# Patient Record
Sex: FEMALE | URBAN - METROPOLITAN AREA
[De-identification: names, ages, dates, MRNs, and addresses within clinical notes are randomized per-mention and may not be internally consistent; named-entity substitution may affect disease eponyms.]

---

## 2023-03-07 ENCOUNTER — APPOINTMENT (RX ONLY)
Dept: URBAN - METROPOLITAN AREA CLINIC 168 | Facility: CLINIC | Age: 68
Setting detail: DERMATOLOGY
End: 2023-03-07

## 2023-03-07 PROBLEM — D48.5 NEOPLASM OF UNCERTAIN BEHAVIOR OF SKIN: Status: ACTIVE | Noted: 2023-03-07

## 2023-03-07 PROCEDURE — ? BIOPSY BY SHAVE METHOD

## 2023-03-07 PROCEDURE — 11102 TANGNTL BX SKIN SINGLE LES: CPT

## 2023-03-07 NOTE — PROCEDURE: BIOPSY BY SHAVE METHOD
Detail Level: Detailed
Depth Of Biopsy: dermis
Was A Bandage Applied: Yes
Size Of Lesion In Cm: 0.8
X Size Of Lesion In Cm: 0
Biopsy Type: H and E
Biopsy Method: Personna blade
Anesthesia Type: 1% lidocaine with epinephrine
Hemostasis: Drysol
Wound Care: Petrolatum
Dressing: bandage
Destruction After The Procedure: No
Type Of Destruction Used: Curettage
Curettage Text: The wound bed was treated with curettage after the biopsy was performed.
Cryotherapy Text: The wound bed was treated with cryotherapy after the biopsy was performed.
Electrodesiccation Text: The wound bed was treated with electrodesiccation after the biopsy was performed.
Electrodesiccation And Curettage Text: The wound bed was treated with electrodesiccation and curettage after the biopsy was performed.
Silver Nitrate Text: The wound bed was treated with silver nitrate after the biopsy was performed.
Lab: -[id number]
Lab Facility: [id number]
Consent: Written consent was obtained and risks were reviewed including but not limited to scarring, infection, bleeding, scabbing, incomplete removal, nerve damage and allergy to anesthesia.
Post-Care Instructions: I reviewed with the patient in detail post-care instructions. Patient is to keep the biopsy site dry overnight, and then apply plain white petrolatum jelly once daily until healed. Patient should wash with soap and water daily.
Notification Instructions: Patient will be notified of biopsy results. However, patient instructed to call the office if not contacted within 2 weeks.
Billing Type: Third-Party Bill
Information: Selecting Yes will display possible errors in your note based on the variables you have selected. This validation is only offered as a suggestion for you. PLEASE NOTE THAT THE VALIDATION TEXT WILL BE REMOVED WHEN YOU FINALIZE YOUR NOTE. IF YOU WANT TO FAX A PRELIMINARY NOTE YOU WILL NEED TO TOGGLE THIS TO 'NO' IF YOU DO NOT WANT IT IN YOUR FAXED NOTE.

## 2023-04-14 ENCOUNTER — APPOINTMENT (RX ONLY)
Dept: URBAN - METROPOLITAN AREA CLINIC 144 | Facility: CLINIC | Age: 68
Setting detail: DERMATOLOGY
End: 2023-04-14

## 2023-04-14 PROBLEM — C44.311 BASAL CELL CARCINOMA OF SKIN OF NOSE: Status: ACTIVE | Noted: 2023-04-14

## 2023-04-14 PROCEDURE — ? CONSULTATION FOR ELECTRON BEAM THERAPY

## 2023-04-14 PROCEDURE — ? CONSULTATION FOR MOHS SURGERY

## 2023-04-14 PROCEDURE — ? COUNSELING

## 2023-04-14 PROCEDURE — 99203 OFFICE O/P NEW LOW 30 MIN: CPT

## 2023-04-14 NOTE — HPI: SKIN LESION (BASAL CELL CARCINOMA)
Is This A New Presentation, Or A Follow-Up?: Basal Cell Carcinoma
Location From Outside Provider (Will Override Previously Chosen Location): Nasal supratip
When Was Basal Cell Biopsied? (Optional): 03/07/2023

## 2023-04-14 NOTE — PROCEDURE: CONSULTATION FOR MOHS SURGERY
Detail Level: Detailed
X Size Of Lesion In Cm (Optional): 0
Anatomic Location From Referring Provider: nasal supratip
Incorporate Mauc In Note: Yes

## 2023-07-14 ENCOUNTER — APPOINTMENT (RX ONLY)
Dept: URBAN - NONMETROPOLITAN AREA CLINIC 12 | Facility: CLINIC | Age: 68
Setting detail: DERMATOLOGY
End: 2023-07-14

## 2023-07-14 PROBLEM — C44.311 BASAL CELL CARCINOMA OF SKIN OF NOSE: Status: ACTIVE | Noted: 2023-07-14

## 2023-07-14 PROCEDURE — 99214 OFFICE O/P EST MOD 30 MIN: CPT

## 2023-07-14 PROCEDURE — ? PATIENT SPECIFIC COUNSELING

## 2023-07-14 PROCEDURE — ? CONSULTATION FOR ELECTRON BEAM THERAPY

## 2023-07-14 NOTE — PROCEDURE: PATIENT SPECIFIC COUNSELING
The patient was educated about the following:\\nIt is normal to have skin reactions during radiation therapy treatment. \\nPatients may develop redness at the treatment site similar to a sunburn. \\nTopical creams are prescribed that will help reduce side effects and limit irritation.
Detail Level: Zone
Over 60 plan:\\nWe discussed several treatment regimens ranging from 4-6 weeks with the patient. The patient understands there is a better potential cosmetic result and less side effects/risks (brisk skin reaction, hypopigmentation to the treated field, telangiectasia) with a lower daily dose regimen. We extensively discussed EBT vs Mohs surgery. After discussion the patient would like to proceed with a protracted treatment regimen. We will schedule initial simulations today. At this time all questions appear to be answered to the patient’s satisfaction. Thank you for allowing the radiation team to participate in the care of this patient.

## 2023-07-14 NOTE — PROCEDURE: CONSULTATION FOR ELECTRON BEAM THERAPY
Previous Radiation History: No
Other Plan: We discussed a 5 to 6 week treatment plan and patient agreeable to the 6 week treatment regimen at 200 cgy daily
Detail Level: Detailed
X Size Of Lesion In Cm (Optional): 1.1
Size Of Lesion: 1.2

## 2023-08-02 ENCOUNTER — APPOINTMENT (RX ONLY)
Dept: URBAN - NONMETROPOLITAN AREA CLINIC 12 | Facility: CLINIC | Age: 68
Setting detail: DERMATOLOGY
End: 2023-08-02

## 2023-08-02 PROBLEM — C44.311 BASAL CELL CARCINOMA OF SKIN OF NOSE: Status: ACTIVE | Noted: 2023-08-02

## 2023-08-02 PROCEDURE — 77290 THER RAD SIMULAJ FIELD CPLX: CPT

## 2023-08-02 PROCEDURE — ? INITIAL COMPLEX SIMULATION

## 2023-08-02 PROCEDURE — 77263 THER RADIOLOGY TX PLNG CPLX: CPT

## 2023-08-02 NOTE — PROCEDURE: INITIAL COMPLEX SIMULATION
Custom Bolus Type: custom mixed, molded, and shaped
Acetate Template Statement (Will Not Render If Left Blank): An acetate template will be used to fabricate a custom lead shielding device to allow for lead on skin collimation. This type of shielding will best limit beam penumbra. Additional shielding will also be added to protect adjacent strutures.
Cummulative Dose (Include Units): 6000cGy
Detail Level: Simple
Intro Statement For Treatment Plan (Will Not Render If Left Blank): Records, reports, pathology, and physical exam have been completed, interpreted and reviewed to assist in defining the course of radiation therapy treatment. Parameters interpreted include tumor histology, size, location, extent of disease and prior medical history. These factors will integrate into radiation field design. A complex electron beam simulation is necessary to accomplish a reproducible beam arrangement, field size and target volume with which to treat the tumor. Beam modifying devices will be designed and utilized as necessary to optimize the treatment and to best spare normal tissues. Complex blocking will be performed to minimize radiation scatter (penumbra), shape to target volume, and to best protect adjacent and underlying normal tissues. Fields will be verified on the patient prior to radiation delivery.
Daily Dose (Include Units): 200cGy
Closing Statement (Will Not Render If Left Blank): The patient tolerated the simulation well and has had all of their questions answered to their satisfaction. The patient will return for field verification, simple simulation before radiation is delivered to confirm patient positioning and block shaping and positioning.
Pathology: Use Selected EMA Impression
Bolus Thickness In Cm: 0.6
Intro Statement (Will Not Render If Left Blank): I then designed a radiation field to include the gross lesion (GTV) with additional margin that accounted for both potential subclinical microscopic extension (CTV), as well as for the beam characteristics of superficial electrons (PTV). Care was taken in designing the field near adjacent normal tissue structures. The target volume was drawn on the skin surface with a permanent marker and then the design with reference marks was carefully traced onto an acetate template. Digital photographs were taken.
Location Override (Location Will Render As Ema Body Location If Left Blank): BCC-Nasal Supratip
Send Clinical Treatment Planning Code To Pm?: Yes - [id number]
Use Custom Eyeshields: Yes
Dosing Schedule: 5 days a week
Energy In Mev: 6
Treatment Length (Include Units): 6 weeks
Position: supine
Isodose: [address]
Eye Shield Statement (Will Not Render If Left Blank): External eye shields will be placed daily to limit scatter dose to the lenses of the eyes. Due to the COVID-19 pandemic and the risk to our patients, customized eye shielding is deamed safer than reusable eye shielding. We therefore will custom design and fabricate bilateral eye shields made of shaped lead and covered by wax, for each of our patients. These will be used only during the course of treatment and then destroyed and constitute a complex fabrication process and device.

## 2023-08-04 ENCOUNTER — APPOINTMENT (RX ONLY)
Dept: URBAN - NONMETROPOLITAN AREA CLINIC 12 | Facility: CLINIC | Age: 68
Setting detail: DERMATOLOGY
End: 2023-08-04

## 2023-08-04 PROBLEM — C44.311 BASAL CELL CARCINOMA OF SKIN OF NOSE: Status: ACTIVE | Noted: 2023-08-04

## 2023-08-04 PROCEDURE — ? SIMPLE SIMULATION - BLOCK CHECK

## 2023-08-04 PROCEDURE — 77280 THER RAD SIMULAJ FIELD SMPL: CPT

## 2023-08-04 NOTE — PROCEDURE: SIMPLE SIMULATION - BLOCK CHECK
Location Override (Location Will Render As Ema Body Location If Left Blank): BCC-Nasal Supratip
Position: supine
Bolus Thickness In Cm: 0.6
Detail Level: Simple
Custom Bolus Type: custom mixed, molded, and shaped
Closing Statement (Will Not Render If Left Blank): Working with the physician, the therapist adjusted the machine gantry, table, and collimator and adjusted patient positioning to allow an appositional electron beam. SSD measurements were verified using the projected optical distance indicator light and room lasers. The field was positioned at 100cm SSD to the top of the bolus material. The machine parameters were recorded. The treatment light field was photographed projected onto the patient's skin. Further digital photographs were taken and will be used as a reference.

## 2023-08-07 ENCOUNTER — APPOINTMENT (RX ONLY)
Dept: URBAN - NONMETROPOLITAN AREA CLINIC 12 | Facility: CLINIC | Age: 68
Setting detail: DERMATOLOGY
End: 2023-08-07

## 2023-08-07 PROBLEM — C44.311 BASAL CELL CARCINOMA OF SKIN OF NOSE: Status: ACTIVE | Noted: 2023-08-07

## 2023-08-07 PROCEDURE — 77427 RADIATION TX MANAGEMENT X5: CPT

## 2023-08-07 PROCEDURE — ? ELECTRON BEAM THERAPY

## 2023-08-07 PROCEDURE — G6012 RADIATION TREATMENT DELIVERY: HCPCS

## 2023-08-07 NOTE — PROCEDURE: ELECTRON BEAM THERAPY
Daily Dosage Administered: [id number] [facility]
Total Rx Dosage: [address]
Change Daily Dosage Administered Mid Treatment?: No
Location Override (Location Will Render As Ema Body Location If Left Blank): nasal supratip
Mild, Moderate, Brisk Erythema Or Dry Desquamation Counseling: The patient was instructed in meticulous wound care and counseled on potential and expected side effects of treatment and reasonable expectations for the time to heal. They appeared to have all of their questions answered to their satisfaction. They were recommended to rinse the treatment site with mild soap and water (recommended Dove, Neutrogena or Cetaphil). After rinsing the treatment site twice a day they are to apply a pea-sized amount of Aquaphor healing ointment twice daily. Aquaphor samples were provided. The patient understands to call with any questions, concerns or difficulties. They were informed of the potentital for infection and counseled on common signs and symptoms of infection including skin redness extending outside of the treatment area, enlargement or skin breakdown, significant warmth, pain, fever or chills or sweats. They voiced an understanding to contact us immediately if any of these symptoms develop. Their follow up appointment was scheduled. They were also instructed to follow-up with dermatology for skin cancer surveillance.
Radiation Units: cGy
Detail Level: Simple
Date Of Fraction 1: 08/07/2023
Dimensions-X Axis In Cm: 0
Early, Moist Desquamation Counseling: The patient was instructed in meticulous wound care and counseled on potential and expected side effects of treatment and reasonable expectations for the time to heal. They appeared to have all of their questions answered to their satisfaction. They were recommended to cleanse the treatment site with dilute hydrogen peroxide and water (using 50:50 ratio). They were told how to apply the solution gently with a cotton ball twice daily. After cleaning, they were instructed to pat the area dry with a soft cloth and then apply a small amount of Silvadene 1% cream twice daily. They were told to return to the clinic in 7 days for re-evaluation. The patient understands to call with any questions, concerns or difficulties. They were informed of the potentital for infection and counseled on common signs and symptoms of infection including skin redness extending outside of the treatment area, enlargement or skin breakdown, significant warmth, pain, fever or chills or sweats. They voiced an understanding to contact us immediately if any of these symptoms develop. Their follow up appointment was scheduled. They were also instructed to follow-up with dermatology for skin cancer surveillance.
Date Of Fraction 2: 08/08/2023
Send Cpt Codes To Pm?: Yes
Date Of Fraction 3: 08/09/2023
Date Of Fraction 4: 08/10/2023
Ebt Cpt Code (Please Add Code Details Below): 
Date Of Fraction 5: 08/11/2023
Assessment: Appropriate reaction
Total Planned Fractions: [id number] [address]

## 2023-08-14 ENCOUNTER — APPOINTMENT (RX ONLY)
Dept: URBAN - NONMETROPOLITAN AREA CLINIC 12 | Facility: CLINIC | Age: 68
Setting detail: DERMATOLOGY
End: 2023-08-14

## 2023-08-14 PROBLEM — C44.311 BASAL CELL CARCINOMA OF SKIN OF NOSE: Status: ACTIVE | Noted: 2023-08-14

## 2023-08-14 PROCEDURE — G6012 RADIATION TREATMENT DELIVERY: HCPCS

## 2023-08-14 PROCEDURE — 77427 RADIATION TX MANAGEMENT X5: CPT

## 2023-08-14 PROCEDURE — ? ELECTRON BEAM THERAPY

## 2023-08-14 NOTE — PROCEDURE: ELECTRON BEAM THERAPY
Daily Dosage Administered: [id number] [facility]
Date Of Fraction 8: 08/16/2023
Total Rx Dosage: [address]
Change Daily Dosage Administered Mid Treatment?: No
Location Override (Location Will Render As Ema Body Location If Left Blank): nasal supratip
Mild, Moderate, Brisk Erythema Or Dry Desquamation Counseling: The patient was instructed in meticulous wound care and counseled on potential and expected side effects of treatment and reasonable expectations for the time to heal. They appeared to have all of their questions answered to their satisfaction. They were recommended to rinse the treatment site with mild soap and water (recommended Dove, Neutrogena or Cetaphil). After rinsing the treatment site twice a day they are to apply a pea-sized amount of Aquaphor healing ointment twice daily. Aquaphor samples were provided. The patient understands to call with any questions, concerns or difficulties. They were informed of the potentital for infection and counseled on common signs and symptoms of infection including skin redness extending outside of the treatment area, enlargement or skin breakdown, significant warmth, pain, fever or chills or sweats. They voiced an understanding to contact us immediately if any of these symptoms develop. Their follow up appointment was scheduled. They were also instructed to follow-up with dermatology for skin cancer surveillance.
Date Of Fraction 9: 08/17/2023
PA notified, Zofran IVP given, pt continuing to belch & vomit bile. Maalox ordered & given, per PA GI will be consulted. Will endorse to day RN's
Radiation Units: cGy
Detail Level: Simple
Date Of Fraction 10: 08/18/2023
Date Of Fraction 1: 08/07/2023
Dimensions-X Axis In Cm: 0
Early, Moist Desquamation Counseling: The patient was instructed in meticulous wound care and counseled on potential and expected side effects of treatment and reasonable expectations for the time to heal. They appeared to have all of their questions answered to their satisfaction. They were recommended to cleanse the treatment site with dilute hydrogen peroxide and water (using 50:50 ratio). They were told how to apply the solution gently with a cotton ball twice daily. After cleaning, they were instructed to pat the area dry with a soft cloth and then apply a small amount of Silvadene 1% cream twice daily. They were told to return to the clinic in 7 days for re-evaluation. The patient understands to call with any questions, concerns or difficulties. They were informed of the potentital for infection and counseled on common signs and symptoms of infection including skin redness extending outside of the treatment area, enlargement or skin breakdown, significant warmth, pain, fever or chills or sweats. They voiced an understanding to contact us immediately if any of these symptoms develop. Their follow up appointment was scheduled. They were also instructed to follow-up with dermatology for skin cancer surveillance.
Date Of Fraction 2: 08/08/2023
Send Cpt Codes To Pm?: Yes
Date Of Fraction 3: 08/09/2023
Date Of Fraction 4: 08/10/2023
Ebt Cpt Code (Please Add Code Details Below): 
Date Of Fraction 5: 08/11/2023
Assessment: Appropriate reaction
Date Of Fraction 6: 08/14/2023
Total Planned Fractions: [id number] [address]
Date Of Fraction 7: 08/15/2023

## 2023-08-21 ENCOUNTER — APPOINTMENT (RX ONLY)
Dept: URBAN - NONMETROPOLITAN AREA CLINIC 12 | Facility: CLINIC | Age: 68
Setting detail: DERMATOLOGY
End: 2023-08-21

## 2023-08-21 PROBLEM — C44.311 BASAL CELL CARCINOMA OF SKIN OF NOSE: Status: ACTIVE | Noted: 2023-08-21

## 2023-08-21 PROCEDURE — 77427 RADIATION TX MANAGEMENT X5: CPT

## 2023-08-21 PROCEDURE — ? ELECTRON BEAM THERAPY

## 2023-08-21 PROCEDURE — G6012 RADIATION TREATMENT DELIVERY: HCPCS

## 2023-08-21 NOTE — PROCEDURE: ELECTRON BEAM THERAPY
Daily Dosage Administered: [id number] [facility]
Date Of Fraction 8: 08/16/2023
Total Rx Dosage: [address]
Change Daily Dosage Administered Mid Treatment?: No
Location Override (Location Will Render As Ema Body Location If Left Blank): nasal supratip
Mild, Moderate, Brisk Erythema Or Dry Desquamation Counseling: The patient was instructed in meticulous wound care and counseled on potential and expected side effects of treatment and reasonable expectations for the time to heal. They appeared to have all of their questions answered to their satisfaction. They were recommended to rinse the treatment site with mild soap and water (recommended Dove, Neutrogena or Cetaphil). After rinsing the treatment site twice a day they are to apply a pea-sized amount of Aquaphor healing ointment twice daily. Aquaphor samples were provided. The patient understands to call with any questions, concerns or difficulties. They were informed of the potentital for infection and counseled on common signs and symptoms of infection including skin redness extending outside of the treatment area, enlargement or skin breakdown, significant warmth, pain, fever or chills or sweats. They voiced an understanding to contact us immediately if any of these symptoms develop. Their follow up appointment was scheduled. They were also instructed to follow-up with dermatology for skin cancer surveillance.
Date Of Fraction 9: 08/17/2023
Radiation Units: cGy
Detail Level: Simple
Date Of Fraction 10: 08/18/2023
Date Of Fraction 1: 08/07/2023
Dimensions-X Axis In Cm: 0
Early, Moist Desquamation Counseling: The patient was instructed in meticulous wound care and counseled on potential and expected side effects of treatment and reasonable expectations for the time to heal. They appeared to have all of their questions answered to their satisfaction. They were recommended to cleanse the treatment site with dilute hydrogen peroxide and water (using 50:50 ratio). They were told how to apply the solution gently with a cotton ball twice daily. After cleaning, they were instructed to pat the area dry with a soft cloth and then apply a small amount of Silvadene 1% cream twice daily. They were told to return to the clinic in 7 days for re-evaluation. The patient understands to call with any questions, concerns or difficulties. They were informed of the potentital for infection and counseled on common signs and symptoms of infection including skin redness extending outside of the treatment area, enlargement or skin breakdown, significant warmth, pain, fever or chills or sweats. They voiced an understanding to contact us immediately if any of these symptoms develop. Their follow up appointment was scheduled. They were also instructed to follow-up with dermatology for skin cancer surveillance.
Date Of Fraction 2: 08/08/2023
Date Of Fraction 11: 08/21/2023
Send Cpt Codes To Pm?: Yes
Date Of Fraction 3: 08/09/2023
Date Of Fraction 12: 08/22/2023
Date Of Fraction 13: 08/23/2023
Date Of Fraction 4: 08/10/2023
Ebt Cpt Code (Please Add Code Details Below): 
Date Of Fraction 14: 08/24/2023
Date Of Fraction 5: 08/11/2023
Assessment: Appropriate reaction
Date Of Fraction 15: 08/25/2023
Date Of Fraction 6: 08/14/2023
Total Planned Fractions: [id number] [address]
Date Of Fraction 7: 08/15/2023

## 2023-08-23 ENCOUNTER — APPOINTMENT (RX ONLY)
Dept: URBAN - NONMETROPOLITAN AREA CLINIC 12 | Facility: CLINIC | Age: 68
Setting detail: DERMATOLOGY
End: 2023-08-23

## 2023-08-23 ENCOUNTER — RX ONLY (OUTPATIENT)
Age: 68
Setting detail: RX ONLY
End: 2023-08-23

## 2023-08-23 PROBLEM — C44.311 BASAL CELL CARCINOMA OF SKIN OF NOSE: Status: ACTIVE | Noted: 2023-08-23

## 2023-08-23 PROCEDURE — 77290 THER RAD SIMULAJ FIELD CPLX: CPT

## 2023-08-23 PROCEDURE — ? COMPLEX SIMULATION - REDUCED FIELD

## 2023-08-23 RX ORDER — MUPIROCIN 20 MG/G
OINTMENT TOPICAL
Qty: 22 | Refills: 1 | Status: ERX | COMMUNITY
Start: 2023-08-23

## 2023-08-23 NOTE — PROCEDURE: COMPLEX SIMULATION - REDUCED FIELD
Energy In Mev: 6
Custom Bolus Type: custom mixed, molded, and shaped
Intro Statement (Will Not Render If Left Blank): A new radiation electron beam field was designed to include the gross lesion (GTV) with additional margin that accounted for both potential subclinical microscopic extension (CTV), as well as for the beam characteristics of superficial electrons (PTV). This field took into account the changes in the volume of the tumor that have occurred during radiation therapy. Care was taken in designing the field near adjacent normal tissue structures. The target volume was drawn on the skin surface with a permanent marker and then the design with reference marks was carefully traced onto an acetate template. Digital photographs were taken.
Isodose: [address]
Acetate Template Statement (Will Not Render If Left Blank): The acetate template will be used to fabricate a custom lead on skin shielding device to allow for lead on skin collimation. This type of shielding will best limit beam penumbra and define the field.
Closing Statement (Will Not Render If Left Blank): The patient tolerated the complex electron beam simulation well and will continue on radiotherapy using the modified field. The patient will return for a field verification simulation before radiation is delivered to confirm patient positioning and block fabrication parameters.
Detail Level: Simple
Pathology: Use Selected EMA Impression
Bolus Thickness In Cm: 0.6
Position: supine

## 2023-08-25 ENCOUNTER — APPOINTMENT (RX ONLY)
Dept: URBAN - NONMETROPOLITAN AREA CLINIC 12 | Facility: CLINIC | Age: 68
Setting detail: DERMATOLOGY
End: 2023-08-25

## 2023-08-25 PROBLEM — C44.311 BASAL CELL CARCINOMA OF SKIN OF NOSE: Status: ACTIVE | Noted: 2023-08-25

## 2023-08-25 PROCEDURE — ? SIMPLE SIMULATION - BLOCK CHECK

## 2023-08-25 PROCEDURE — 77280 THER RAD SIMULAJ FIELD SMPL: CPT

## 2023-08-28 ENCOUNTER — APPOINTMENT (RX ONLY)
Dept: URBAN - NONMETROPOLITAN AREA CLINIC 12 | Facility: CLINIC | Age: 68
Setting detail: DERMATOLOGY
End: 2023-08-28

## 2023-08-28 PROBLEM — C44.311 BASAL CELL CARCINOMA OF SKIN OF NOSE: Status: ACTIVE | Noted: 2023-08-28

## 2023-08-28 PROCEDURE — G6012 RADIATION TREATMENT DELIVERY: HCPCS

## 2023-08-28 PROCEDURE — 77427 RADIATION TX MANAGEMENT X5: CPT

## 2023-08-28 PROCEDURE — ? ELECTRON BEAM THERAPY

## 2023-08-28 NOTE — PROCEDURE: ELECTRON BEAM THERAPY
Daily Dosage Administered: [id number] [facility]
Date Of Fraction 17: 08/29/2023
Date Of Fraction 8: 08/16/2023
Total Rx Dosage: [address]
Change Daily Dosage Administered Mid Treatment?: No
Location Override (Location Will Render As Ema Body Location If Left Blank): Nasal /Supratip
Mild, Moderate, Brisk Erythema Or Dry Desquamation Counseling: The patient was instructed in meticulous wound care and counseled on potential and expected side effects of treatment and reasonable expectations for the time to heal. They appeared to have all of their questions answered to their satisfaction. They were recommended to rinse the treatment site with mild soap and water (recommended Dove, Neutrogena or Cetaphil). After rinsing the treatment site twice a day they are to apply a pea-sized amount of Aquaphor healing ointment twice daily. Aquaphor samples were provided. The patient understands to call with any questions, concerns or difficulties. They were informed of the potentital for infection and counseled on common signs and symptoms of infection including skin redness extending outside of the treatment area, enlargement or skin breakdown, significant warmth, pain, fever or chills or sweats. They voiced an understanding to contact us immediately if any of these symptoms develop. Their follow up appointment was scheduled. They were also instructed to follow-up with dermatology for skin cancer surveillance.
Date Of Fraction 9: 08/17/2023
Radiation Units: cGy
Date Of Fraction 18: 08/30/2023
Detail Level: Simple
Date Of Fraction 10: 08/18/2023
Date Of Fraction 19: 08/31/2023
Date Of Fraction 1: 08/07/2023
Dimensions-X Axis In Cm: 0
Early, Moist Desquamation Counseling: The patient was instructed in meticulous wound care and counseled on potential and expected side effects of treatment and reasonable expectations for the time to heal. They appeared to have all of their questions answered to their satisfaction. They were recommended to cleanse the treatment site with dilute hydrogen peroxide and water (using 50:50 ratio). They were told how to apply the solution gently with a cotton ball twice daily. After cleaning, they were instructed to pat the area dry with a soft cloth and then apply a small amount of Silvadene 1% cream twice daily. They were told to return to the clinic in 7 days for re-evaluation. The patient understands to call with any questions, concerns or difficulties. They were informed of the potentital for infection and counseled on common signs and symptoms of infection including skin redness extending outside of the treatment area, enlargement or skin breakdown, significant warmth, pain, fever or chills or sweats. They voiced an understanding to contact us immediately if any of these symptoms develop. Their follow up appointment was scheduled. They were also instructed to follow-up with dermatology for skin cancer surveillance.
Date Of Fraction 2: 08/08/2023
Date Of Fraction 20: 09/01/2023
Date Of Fraction 11: 08/21/2023
Send Cpt Codes To Pm?: Yes
Date Of Fraction 3: 08/09/2023
Date Of Fraction 12: 08/22/2023
Date Of Fraction 13: 08/23/2023
Date Of Fraction 4: 08/10/2023
Ebt Cpt Code (Please Add Code Details Below): 
Date Of Fraction 14: 08/24/2023
Date Of Fraction 5: 08/11/2023
Assessment: Appropriate reaction
Date Of Fraction 15: 08/25/2023
Date Of Fraction 6: 08/14/2023
Total Planned Fractions: [id number] [address]
Date Of Fraction 16: 08/28/2023
Date Of Fraction 7: 08/15/2023

## 2023-08-30 ENCOUNTER — APPOINTMENT (RX ONLY)
Dept: URBAN - NONMETROPOLITAN AREA CLINIC 12 | Facility: CLINIC | Age: 68
Setting detail: DERMATOLOGY
End: 2023-08-30

## 2023-08-30 PROBLEM — C44.311 BASAL CELL CARCINOMA OF SKIN OF NOSE: Status: ACTIVE | Noted: 2023-08-30

## 2023-08-30 PROCEDURE — ? SIMPLE SIMULATION - BLOCK CHECK

## 2023-08-30 PROCEDURE — ? COMPLEX SIMULATION - REDUCED FIELD

## 2023-08-30 PROCEDURE — 77280 THER RAD SIMULAJ FIELD SMPL: CPT | Mod: 59

## 2023-08-30 PROCEDURE — 77290 THER RAD SIMULAJ FIELD CPLX: CPT

## 2023-08-30 NOTE — PROCEDURE: COMPLEX SIMULATION - REDUCED FIELD
Custom Bolus Type: custom mixed, molded, and shaped
Intro Statement (Will Not Render If Left Blank): A new radiation electron beam field was designed to include the gross lesion (GTV) with additional margin that accounted for both potential subclinical microscopic extension (CTV), as well as for the beam characteristics of superficial electrons (PTV). This field took into account the changes in the volume of the tumor that have occurred during radiation therapy. Care was taken in designing the field near adjacent normal tissue structures. The target volume was drawn on the skin surface with a permanent marker and then the design with reference marks was carefully traced onto an acetate template. Digital photographs were taken.
Closing Statement (Will Not Render If Left Blank): The patient tolerated the complex electron beam simulation well and will continue on radiotherapy using the modified field. The patient will return for a field verification simulation before radiation is delivered to confirm patient positioning and block fabrication parameters.
Position: supine
Detail Level: Simple
Energy In Mev: 6
Bolus Thickness In Cm: 0.6
Pathology: Use Selected EMA Impression
Acetate Template Statement (Will Not Render If Left Blank): The acetate template will be used to fabricate a custom lead on skin shielding device to allow for lead on skin collimation. This type of shielding will best limit beam penumbra and define the field.
Isodose: [address]

## 2023-08-30 NOTE — PROCEDURE: SIMPLE SIMULATION - BLOCK CHECK
Bolus Thickness In Cm: 0.6
Detail Level: Simple
Position: supine
Custom Bolus Type: custom mixed, molded, and shaped
Closing Statement (Will Not Render If Left Blank): Working with the physician, the therapist adjusted the machine gantry, table, and collimator and adjusted patient positioning to allow an appositional electron beam. SSD measurements were verified using the projected optical distance indicator light and room lasers. The field was positioned at 100cm SSD to the top of the bolus material. The machine parameters were recorded. The treatment light field was photographed projected onto the patient's skin. Further digital photographs were taken and will be used as a reference.

## 2023-09-05 ENCOUNTER — APPOINTMENT (RX ONLY)
Dept: URBAN - NONMETROPOLITAN AREA CLINIC 12 | Facility: CLINIC | Age: 68
Setting detail: DERMATOLOGY
End: 2023-09-05

## 2023-09-05 PROBLEM — C44.311 BASAL CELL CARCINOMA OF SKIN OF NOSE: Status: ACTIVE | Noted: 2023-09-05

## 2023-09-05 PROCEDURE — 77427 RADIATION TX MANAGEMENT X5: CPT

## 2023-09-05 PROCEDURE — ? ELECTRON BEAM THERAPY

## 2023-09-05 PROCEDURE — G6012 RADIATION TREATMENT DELIVERY: HCPCS

## 2023-09-05 NOTE — PROCEDURE: ELECTRON BEAM THERAPY
Daily Dosage Administered: [id number] [facility]
Date Of Fraction 17: 08/29/2023
Date Of Fraction 8: 08/16/2023
Total Rx Dosage: [address]
Change Daily Dosage Administered Mid Treatment?: No
Location Override (Location Will Render As Ema Body Location If Left Blank): Nasal /Supratip
Mild, Moderate, Brisk Erythema Or Dry Desquamation Counseling: The patient was instructed in meticulous wound care and counseled on potential and expected side effects of treatment and reasonable expectations for the time to heal. They appeared to have all of their questions answered to their satisfaction. They were recommended to rinse the treatment site with mild soap and water (recommended Dove, Neutrogena or Cetaphil). After rinsing the treatment site twice a day they are to apply a pea-sized amount of Aquaphor healing ointment twice daily. Aquaphor samples were provided. The patient understands to call with any questions, concerns or difficulties. They were informed of the potentital for infection and counseled on common signs and symptoms of infection including skin redness extending outside of the treatment area, enlargement or skin breakdown, significant warmth, pain, fever or chills or sweats. They voiced an understanding to contact us immediately if any of these symptoms develop. Their follow up appointment was scheduled. They were also instructed to follow-up with dermatology for skin cancer surveillance.
Date Of Fraction 9: 08/17/2023
Radiation Units: cGy
Date Of Fraction 18: 08/30/2023
Detail Level: Simple
Date Of Fraction 10: 08/18/2023
Date Of Fraction 19: 08/31/2023
Date Of Fraction 1: 08/07/2023
Dimensions-X Axis In Cm: 0
Early, Moist Desquamation Counseling: The patient was instructed in meticulous wound care and counseled on potential and expected side effects of treatment and reasonable expectations for the time to heal. They appeared to have all of their questions answered to their satisfaction. They were recommended to cleanse the treatment site with dilute hydrogen peroxide and water (using 50:50 ratio). They were told how to apply the solution gently with a cotton ball twice daily. After cleaning, they were instructed to pat the area dry with a soft cloth and then apply a small amount of Silvadene 1% cream twice daily. They were told to return to the clinic in 7 days for re-evaluation. The patient understands to call with any questions, concerns or difficulties. They were informed of the potentital for infection and counseled on common signs and symptoms of infection including skin redness extending outside of the treatment area, enlargement or skin breakdown, significant warmth, pain, fever or chills or sweats. They voiced an understanding to contact us immediately if any of these symptoms develop. Their follow up appointment was scheduled. They were also instructed to follow-up with dermatology for skin cancer surveillance.
Date Of Fraction 2: 08/08/2023
Date Of Fraction 20: 09/01/2023
Date Of Fraction 11: 08/21/2023
Send Cpt Codes To Pm?: Yes
Date Of Fraction 3: 08/09/2023
Date Of Fraction 21: 09/05/2023
Date Of Fraction 12: 08/22/2023
Date Of Fraction 22: 09/06/2023
Date Of Fraction 13: 08/23/2023
Date Of Fraction 4: 08/10/2023
Ebt Cpt Code (Please Add Code Details Below): 
Date Of Fraction 23: 09/07/2023
Date Of Fraction 14: 08/24/2023
Date Of Fraction 5: 08/11/2023
Assessment: Appropriate reaction
Date Of Fraction 24: 09/08/2023
Date Of Fraction 15: 08/25/2023
Date Of Fraction 6: 08/14/2023
Total Planned Fractions: [id number] [address]
Date Of Fraction 16: 08/28/2023
Date Of Fraction 25: 09/11/2023
Date Of Fraction 7: 08/15/2023

## 2023-09-12 ENCOUNTER — APPOINTMENT (RX ONLY)
Dept: URBAN - NONMETROPOLITAN AREA CLINIC 12 | Facility: CLINIC | Age: 68
Setting detail: DERMATOLOGY
End: 2023-09-12

## 2023-09-12 PROBLEM — C44.311 BASAL CELL CARCINOMA OF SKIN OF NOSE: Status: ACTIVE | Noted: 2023-09-12

## 2023-09-12 PROCEDURE — G6012 RADIATION TREATMENT DELIVERY: HCPCS

## 2023-09-12 PROCEDURE — ? ELECTRON BEAM THERAPY

## 2023-09-12 PROCEDURE — 77427 RADIATION TX MANAGEMENT X5: CPT

## 2023-09-12 NOTE — PROCEDURE: ELECTRON BEAM THERAPY
Daily Dosage Administered: [id number] [facility]
Date Of Fraction 17: 08/29/2023
Date Of Fraction 8: 08/16/2023
Date Of Fraction 26: 09/12/2023
Total Rx Dosage: [address]
Change Daily Dosage Administered Mid Treatment?: No
Location Override (Location Will Render As Ema Body Location If Left Blank): Nasal /Supratip
Mild, Moderate, Brisk Erythema Or Dry Desquamation Counseling: The patient was instructed in meticulous wound care and counseled on potential and expected side effects of treatment and reasonable expectations for the time to heal. She appeared to have all of her questions answered to her satisfaction. She was recommended to rinse the treatment site with mild soap and water (recommended Dove, Neutrogena or Cetaphil). After rinsing the treatment site twice a day she is to apply Mupirocin oint twice a day and aquaphor oint in between uses. Aquaphor samples were provided. The patient understands to call with any questions, concerns or difficulties. She was informed of the potential for infection and counseled on common signs and symptoms of infection including skin redness extending outside of the treatment area, enlargement or skin breakdown, significant warmth, pain, fever, chills or sweats. She voiced an understanding to contact us immediately if any of these symptoms develop. Her follow up appointment was scheduled for 10/6/23. She was also instructed to follow-up with dermatology for skin cancer surveillance.
Skin Reaction?: expected skin reaction
Date Of Fraction 9: 08/17/2023
Radiation Units: cGy
Date Of Fraction 27: 09/13/2023
Date Of Fraction 18: 08/30/2023
Detail Level: Simple
Date Of Fraction 28: 09/14/2023
Date Of Fraction 10: 08/18/2023
Date Of Fraction 19: 08/31/2023
Date Of Fraction 1: 08/07/2023
Dimensions-X Axis In Cm: 0
Early, Moist Desquamation Counseling: The patient was instructed in meticulous wound care and counseled on potential and expected side effects of treatment and reasonable expectations for the time to heal. They appeared to have all of their questions answered to their satisfaction. They were recommended to cleanse the treatment site with dilute hydrogen peroxide and water (using 50:50 ratio). They were told how to apply the solution gently with a cotton ball twice daily. After cleaning, they were instructed to pat the area dry with a soft cloth and then apply a small amount of Silvadene 1% cream twice daily. They were told to return to the clinic in 7 days for re-evaluation. The patient understands to call with any questions, concerns or difficulties. They were informed of the potentital for infection and counseled on common signs and symptoms of infection including skin redness extending outside of the treatment area, enlargement or skin breakdown, significant warmth, pain, fever or chills or sweats. They voiced an understanding to contact us immediately if any of these symptoms develop. Their follow up appointment was scheduled. They were also instructed to follow-up with dermatology for skin cancer surveillance.
Date Of Fraction 2: 08/08/2023
Date Of Fraction 29: 09/15/2023
Date Of Fraction 20: 09/01/2023
Date Of Fraction 11: 08/21/2023
Send Cpt Codes To Pm?: Yes
Date Of Fraction 30: 09/18/2023
Lesion Regression?: 100
Date Of Fraction 3: 08/09/2023
Date Of Fraction 21: 09/05/2023
Date Of Fraction 12: 08/22/2023
Date Of Fraction 22: 09/06/2023
Date Of Fraction 13: 08/23/2023
Date Of Fraction 4: 08/10/2023
Ebt Cpt Code (Please Add Code Details Below): 
Date Of Fraction 23: 09/07/2023
Date Of Fraction 14: 08/24/2023
Date Of Fraction 5: 08/11/2023
Clinical Recommendations: Skin recommendations for mild, moderate, brisk erythema or dry desquamation
How Was The Treatment Tolerated?: very well
Assessment: Appropriate reaction
Date Of Fraction 24: 09/08/2023
Date Of Fraction 15: 08/25/2023
Date Of Fraction 6: 08/14/2023
Total Planned Fractions: [id number] [address]
Date Of Fraction 16: 08/28/2023
Date Of Fraction 25: 09/11/2023
Date Of Fraction 7: 08/15/2023

## 2023-10-25 ENCOUNTER — APPOINTMENT (RX ONLY)
Dept: URBAN - NONMETROPOLITAN AREA CLINIC 12 | Facility: CLINIC | Age: 68
Setting detail: DERMATOLOGY
End: 2023-10-25

## 2023-10-25 DIAGNOSIS — Z029 ENCOUNTERS FOR UNSPECIFIED ADMINISTRATIVE PURPOSE: ICD-10-CM

## 2023-10-25 ASSESSMENT — LOCATION ZONE DERM: LOCATION ZONE: NOSE

## 2023-10-25 ASSESSMENT — LOCATION SIMPLE DESCRIPTION DERM: LOCATION SIMPLE: NOSE

## 2023-10-25 ASSESSMENT — LOCATION DETAILED DESCRIPTION DERM: LOCATION DETAILED: NASAL SUPRATIP

## 2023-11-17 ENCOUNTER — APPOINTMENT (RX ONLY)
Dept: URBAN - NONMETROPOLITAN AREA CLINIC 12 | Facility: CLINIC | Age: 68
Setting detail: DERMATOLOGY
End: 2023-11-17

## 2023-11-17 DIAGNOSIS — Z029 ENCOUNTERS FOR UNSPECIFIED ADMINISTRATIVE PURPOSE: ICD-10-CM
